# Patient Record
(demographics unavailable — no encounter records)

---

## 2025-06-02 NOTE — CHIEF COMPLAINT
[Family Member] : family member [FreeTextEntry1] : [facility] [place] [facility] [address] [place] [address]

## 2025-06-02 NOTE — END OF VISIT
[FreeTextEntry3] :  I, Dr. Cristal Hamilton, personally performed the evaluation and management of (E/M) services for this new patient. That E/M includes conducting the initial examination, assessing all conditions, and establishing the plan of care. Today, Simran De La Cruz PA-C, was here to observe my evaluation and management services for this patient to be followed going forward.

## 2025-06-02 NOTE — HISTORY OF PRESENT ILLNESS
[FreeTextEntry1] : 65 yo  via 2  referred by [facility] for cervical mass.  Patient PMH significant for HTN, borderline DM, morbid obesity, HFpEF, LAUREN (non-compliant with CPAP), asthma, epilepsy, iron deficiency anemia, gout, RA tardive dyskinesia, anxiety depression and recently admitted to [facility] s/p multiple seizures after running out of her medication, syncope and VB s/p 8u PRBC found to have BL segmental and subsegmental PE's.   25 US Technically limited exam. Heterogeneous, fibroid uterus. IUD with complex fluid in the endometrial cavity, possibly blood products. Endometrium appears thickened, measuring 0.9 cm. Gynecologic  consultation for tissue sampling is recommended. Nonvisualization of the ovaries. Consider nonemergent pelvic MRI for further evaluation.  CT angio chest PE protocol 25: Bilateral pulmonary embolism. No definite right heart strain. Left breast skin thickening with prominent left axillary lymph nodes. Bilateral lung nodules measuring up to 8mm.  MR pelvis 25 large cervical tumor with parametrial invasion. Suspicion for tumor involvement of the lower endometrium and posterior myometrium. Separate nodularity of the lower one-third of the vagina is also consistent with tumor.   Patient denies ever going 1 year without menses, she reports increased constipation and urinary frequency. She reports daily bleeding for years which worsened recently. She had seen a GYN in 2024 and states she was told she has a cervical polyp. She has had IUD in place for 38 years.   Lpap: unsure, never abnormal Lmammo: a while ago Lcolonoscopy: 30+ years ago

## 2025-06-02 NOTE — ASSESSMENT
[FreeTextEntry1] : 65 yo female with cervical mass concerning for advanced cervical malignancy, pathology pending.   Discussed with patient based on her imaging it is likely we area dealing witha cevical cancer. It is unclear at this time if her lung findings are related to her cervical cancer as they are small enough at this time that a benign process could be the cause. I would like to further evaluate with PET CT. Additionally, I would like patient to have mammogram for skin thickening noted on CT angio. We discussed pending PET and final pathology treatment planning will include a combination of chemo radiation or one without the other. Should patient have lung involvement she will likely recieve chemotherapy only and hold RT for syptomatic control of VB. Alternatively, if lung is negative then she will require 5 weeks of chemoRT.

## 2025-06-02 NOTE — CHIEF COMPLAINT
[Family Member] : family member [FreeTextEntry1] : [facility] [place] Office [address] [place] [address]

## 2025-06-02 NOTE — PLAN
[TextEntry] : Await final pathology, will call pt with final result MED ONC and RAD ONC referral for working diagnosis of cervical cancer PET CT to r/o metastatic disease in chest (pulmonary nodules on CT at [facility]) Diagnostic mammogram ordered for breast thickening/tenderness RN navigation team to assist in scheduling appointments PCP to assist in setting up home health care, as [facility] needs to come from PCP d/t specific needs Pt to call with worsened VB or anemia symptoms

## 2025-06-02 NOTE — PHYSICAL EXAM
[Chaperoned Physical Exam] : A chaperone was present in the examining room during all aspects of the physical examination. [PA] : PA [Normal] : Vagina: Normal [Abnormal] : Cervix: Abnormal [FreeTextEntry2] : Simran De La Cruz [de-identified] : IZA TTP [de-identified] : Tenderness of L axilla [de-identified] : Focal tenderness of left breast at 1:00, darkening of skin at this area as well, R axilla WNL [de-identified] : friable mass of cervix, monsels applied.

## 2025-06-02 NOTE — PHYSICAL EXAM
[Chaperoned Physical Exam] : A chaperone was present in the examining room during all aspects of the physical examination. [PA] : PA [Normal] : Vagina: Normal [Abnormal] : Cervix: Abnormal [FreeTextEntry2] : Simran De La Cruz [de-identified] : IZA TTP [de-identified] : Tenderness of L axilla [de-identified] : Focal tenderness of left breast at 1:00, darkening of skin at this area as well, R axilla WNL [de-identified] : friable mass of cervix, monsels applied.

## 2025-06-02 NOTE — PHYSICAL EXAM
[Chaperoned Physical Exam] : A chaperone was present in the examining room during all aspects of the physical examination. [PA] : PA [Normal] : Vagina: Normal [Abnormal] : Cervix: Abnormal [FreeTextEntry2] : Simarn De La Cruz [de-identified] : IZA TTP [de-identified] : Tenderness of L axilla [de-identified] : Focal tenderness of left breast at 1:00, darkening of skin at this area as well, R axilla WNL [de-identified] : friable mass of cervix, monsels applied.

## 2025-06-02 NOTE — ASSESSMENT
[FreeTextEntry1] : 67 yo female with cervical mass concerning for advanced cervical malignancy, pathology pending.   Discussed with patient based on her imaging it is likely we area dealing witha cevical cancer. It is unclear at this time if her lung findings are related to her cervical cancer as they are small enough at this time that a benign process could be the cause. I would like to further evaluate with PET CT. Additionally, I would like patient to have mammogram for skin thickening noted on CT angio. We discussed pending PET and final pathology treatment planning will include a combination of chemo radiation or one without the other. Should patient have lung involvement she will likely recieve chemotherapy only and hold RT for syptomatic control of VB. Alternatively, if lung is negative then she will require 5 weeks of chemoRT.

## 2025-06-02 NOTE — HISTORY OF PRESENT ILLNESS
[FreeTextEntry1] : 67 yo  via 2  referred by [facility] for cervical mass.  Patient PMH significant for HTN, borderline DM, morbid obesity, HFpEF, LAUREN (non-compliant with CPAP), asthma, epilepsy, iron deficiency anemia, gout, RA tardive dyskinesia, anxiety depression and recently admitted to [facility] s/p multiple seizures after running out of her medication, syncope and VB s/p 8u PRBC found to have BL segmental and subsegmental PE's.   25 US Technically limited exam. Heterogeneous, fibroid uterus. IUD with complex fluid in the endometrial cavity, possibly blood products. Endometrium appears thickened, measuring 0.9 cm. Gynecologic  consultation for tissue sampling is recommended. Nonvisualization of the ovaries. Consider nonemergent pelvic MRI for further evaluation.  CT angio chest PE protocol 25: Bilateral pulmonary embolism. No definite right heart strain. Left breast skin thickening with prominent left axillary lymph nodes. Bilateral lung nodules measuring up to 8mm.  MR pelvis 25 large cervical tumor with parametrial invasion. Suspicion for tumor involvement of the lower endometrium and posterior myometrium. Separate nodularity of the lower one-third of the vagina is also consistent with tumor.   Patient denies ever going 1 year without menses, she reports increased constipation and urinary frequency. She reports daily bleeding for years which worsened recently. She had seen a GYN in 2024 and states she was told she has a cervical polyp. She has had IUD in place for 38 years.   Lpap: unsure, never abnormal Lmammo: a while ago Lcolonoscopy: 30+ years ago

## 2025-06-02 NOTE — PLAN
[TextEntry] : Await final pathology, will call pt with final result MED ONC and RAD ONC referral for working diagnosis of cervical cancer PET CT to r/o metastatic disease in chest (pulmonary nodules on CT at [facility]) Diagnostic mammogram ordered for breast thickening/tenderness RN navigation team to assist in scheduling appointments PCP to assist in setting up home health care, [facility] needs to come from PCP d/t specific needs Pt to call with worsened VB or anemia symptoms

## 2025-06-02 NOTE — CHIEF COMPLAINT
[Family Member] : family member [FreeTextEntry1] : [facility] [place] Office [address] [place] [address]

## 2025-06-02 NOTE — PLAN
[TextEntry] : Await final pathology, will call pt with final result MED ONC and RAD ONC referral for working diagnosis of cervical cancer PET CT to r/o metastatic disease in chest (pulmonary nodules on CT at [facility]) Diagnostic mammogram ordered for breast thickening/tenderness RN navigation team to assist in scheduling appointments PCP to assist in setting up home health care, as [facility] needs to come from PCP d/t specific needs Pt to call with worsened VB or anemia symptoms

## 2025-06-02 NOTE — HISTORY OF PRESENT ILLNESS
[FreeTextEntry1] : 65 yo  via 2  referred by [facility] for cervical mass.  Patient PMH significant for HTN, borderline DM, morbid obesity, HFpEF, LAUREN (non-compliant with CPAP), asthma, epilepsy, iron deficiency anemia, gout, RA tardive dyskinesia, anxiety depression and recently admitted to [facility] s/p multiple seizures after running out of her medication, syncope and VB s/p 8u PRBC found to have BL segmental and subsegmental PE's.   25 US Technically limited exam. Heterogeneous, fibroid uterus. IUD with complex fluid in the endometrial cavity, possibly blood products. Endometrium appears thickened, measuring 0.9 cm. Gynecologic  consultation for tissue sampling is recommended. Nonvisualization of the ovaries. Consider nonemergent pelvic MRI for further evaluation.  CT angio chest PE protocol 25: Bilateral pulmonary embolism. No definite right heart strain. Left breast skin thickening with prominent left axillary lymph nodes. Bilateral lung nodules measuring up to 8mm.  MR pelvis 25 large cervical tumor with parametrial invasion. Suspicion for tumor involvement of the lower endometrium and posterior myometrium. Separate nodularity of the lower one-third of the vagina is also consistent with tumor.   Patient denies ever going 1 year without menses, she reports increased constipation and urinary frequency. She reports daily bleeding for years which worsened recently. She had seen a GYN in 2024 and states she was told she has a cervical polyp. She has had IUD in place for 38 years.   Lpap: unsure, never abnormal Lmammo: a while ago Lcolonoscopy: 30+ years ago

## 2025-06-03 NOTE — REASON FOR VISIT
[CV Risk Factors and Non-Cardiac Disease] : CV risk factors and non-cardiac disease [FreeTextEntry3] : Vinnie Nichols MD-[address] [place] [id number] [FreeTextEntry1] : History was provided by patient and chart review.

## 2025-06-03 NOTE — HISTORY OF PRESENT ILLNESS
[FreeTextEntry1] : Ms. Zelda Estrada was seen on 6/3/2025. The patient is a 66-year-old woman with HTN, pre-DM, mild AS, mild MR, morbid obesity, HFpEF, LAUREN (non-compliant with CPAP), asthma, epilepsy, iron deficiency anemia, gout, RA tardive dyskinesia, anxiety depression and recently admitted to [facility] s/p multiple seizures after running out of her medication, syncope and vaginal bleed s/p 8u PRBC found to have BL segmental and subsegmental PE's now on apixaban 5 mg BID. She was referred specifically for ***.     Patient denies ***. Family history: patient reports no knowledge of premature CAD, SCD, device therapies or congenital heart disease in the family. Social history: patient denies smoking, drinking alcohol or using illicit drugs. CV meds: carvedilol 12.5 mg BID, clopidogrel 75 mg daily, simvastatin 40 mg daily Exercise: Diet:

## 2025-06-03 NOTE — CARDIOLOGY SUMMARY
[de-identified] : MPI stress 5/21/2025 1. Normal myocardial perfusion scan, with no evidence of infarction or inducible ischemia.  2. The patient underwent stress testing using pharmacological IV regadenoson.     _ The total procedure time was 4 minutes .     _ The peak heart rate was 11 bpm: 7 % of the patient's predicted maximal heart rate. The heart rate response was normal pharmocologic heart rate response.     _ There was a normal pharmocologic blood pressure response with a maximum blood pressure of 244/72 mmHg.  3. Baseline electrocardiogram: normal sinus rhythm at a rate of 76 bpm with no arrhythmias and T wave inversion.  4. Normal left ventricular regional wall motion.  5. The left ventricle is normal in function and normal in size. The post stress left ventricular EF is 76 %. The stress end diastolic volume is 83 ml and systolic volume is 20 ml.  6. Chest pain prior to test: no chest pain. [de-identified] : TTE 5/7/2025  1. Left ventricular cavity is normal in size. Left ventricular wall thickness is normal. Left ventricular systolic function is normal with an ejection fraction of 65 % by Barney's method of disks. There are no regional wall motion abnormalities seen.  2. Normal left ventricular diastolic function, with normal left ventricular filling pressure.  3. Mildly enlarged right ventricular cavity size and borderline reduced right ventricular systolic function.  4. The right atrium is mildly dilated.  5. Trileaflet aortic valve. Mild aortic stenosis.  6. The peak transaortic velocity is 2.15 m/s, peak transaortic gradient is 18.5 mmHg and mean transaortic gradient is 10.0 mmHg with an LVOT/aortic valve VTI ratio of 0.51. The effective orifice area is estimated at 1.54 cm  7. There is mild calcification of the mitral valve annulus.  8. Mild mitral regurgitation.  9. Mild mitral valve leaflet calcification. 10. Estimated pulmonary artery systolic pressure is 56 mmHg, moderate pulmonary hypertension. 11. No pericardial effusion seen. 12. No prior echocardiogram is available for comparison. [de-identified] : CT angio chest PE protocol 5/7/25: Bilateral pulmonary embolism. No definite right heart strain. Left breast skin thickening with prominent left axillary lymph nodes. Bilateral lung nodules measuring up to 8mm.

## 2025-06-03 NOTE — HISTORY OF PRESENT ILLNESS
[FreeTextEntry1] : Ms. Zelda Estrada was seen on 6/3/2025. The patient is a 66-year-old woman with HTN, pre-DM, mild AS, mild MR, morbid obesity, HFpEF, LAUREN (non-compliant with CPAP), asthma, epilepsy, iron deficiency anemia, gout, RA tardive dyskinesia, anxiety depression and recently admitted to [facility] s/p multiple seizures after running out of her medication, syncope and vaginal bleed s/p 8u PRBC found to have BL segmental and subsegmental PE's now on apixaban 5 mg BID. She was referred specifically for ***.     Patient denies ***. Family history: patient reports no knowledge of premature CAD, SCD, device therapies or congenital heart disease in the family. Social history: patient denies smoking, drinking alcohol or using illicit drugs. CV meds: carvedilol 12.5 mg BID, clopidogrel 75 mg daily, simvastatin 40 mg daily Exercise: Diet:

## 2025-06-03 NOTE — REASON FOR VISIT
[CV Risk Factors and Non-Cardiac Disease] : CV risk factors and non-cardiac disease [FreeTextEntry3] : Vinnie Nichols MD-[address] [place] [id number] [FreeTextEntry1] : History was provided by patient and chart review.

## 2025-06-03 NOTE — CARDIOLOGY SUMMARY
[de-identified] : MPI stress 5/21/2025 1. Normal myocardial perfusion scan, with no evidence of infarction or inducible ischemia.  2. The patient underwent stress testing using pharmacological IV regadenoson.     _ The total procedure time was 4 minutes .     _ The peak heart rate was 11 bpm: 7 % of the patient's predicted maximal heart rate. The heart rate response was normal pharmocologic heart rate response.     _ There was a normal pharmocologic blood pressure response with a maximum blood pressure of 244/72 mmHg.  3. Baseline electrocardiogram: normal sinus rhythm at a rate of 76 bpm with no arrhythmias and T wave inversion.  4. Normal left ventricular regional wall motion.  5. The left ventricle is normal in function and normal in size. The post stress left ventricular EF is 76 %. The stress end diastolic volume is 83 ml and systolic volume is 20 ml.  6. Chest pain prior to test: no chest pain. [de-identified] : TTE 5/7/2025  1. Left ventricular cavity is normal in size. Left ventricular wall thickness is normal. Left ventricular systolic function is normal with an ejection fraction of 65 % by Barney's method of disks. There are no regional wall motion abnormalities seen.  2. Normal left ventricular diastolic function, with normal left ventricular filling pressure.  3. Mildly enlarged right ventricular cavity size and borderline reduced right ventricular systolic function.  4. The right atrium is mildly dilated.  5. Trileaflet aortic valve. Mild aortic stenosis.  6. The peak transaortic velocity is 2.15 m/s, peak transaortic gradient is 18.5 mmHg and mean transaortic gradient is 10.0 mmHg with an LVOT/aortic valve VTI ratio of 0.51. The effective orifice area is estimated at 1.54 cm  7. There is mild calcification of the mitral valve annulus.  8. Mild mitral regurgitation.  9. Mild mitral valve leaflet calcification. 10. Estimated pulmonary artery systolic pressure is 56 mmHg, moderate pulmonary hypertension. 11. No pericardial effusion seen. 12. No prior echocardiogram is available for comparison. [de-identified] : CT angio chest PE protocol 5/7/25: Bilateral pulmonary embolism. No definite right heart strain. Left breast skin thickening with prominent left axillary lymph nodes. Bilateral lung nodules measuring up to 8mm.

## 2025-06-09 NOTE — DISCUSSION/SUMMARY
[EKG obtained to assist in diagnosis and management of assessed problem(s)] : EKG obtained to assist in diagnosis and management of assessed problem(s) [FreeTextEntry1] : 66y F w/ HTN, borderline DM, Obese, CHF (HFpEF), LAUREN (noncompliant with CPAP), asthma, epilepsy, iron def anemia, gout, RA, tardive dyskinesia, anxiety, depression, who presented to [facility] with multiple seizures (after running out of seizure medications), syncope and recent vaginal bleeding, found to have Hg 3.8 and bilateral segmental and subsegmental PEs with concern for underlying malignancy. Also found to have nocturnal pauses up to 13s duration resulting in ILR insertion for arrhythmia surveillance.   Patient presented to the office today s/p ILR implant on 5/14/25 with Dr. Carlos. Since hospital discharge, she reports to be feeling well. She denies further syncopal episodes/seizures. She denies palpitations, chest pain/tightness, dizziness. ILR insertion site is now healed well without s/s of infection or complication. Interrogation of device reveals no arrhythmia or pause episodes. We reviewed use of symptom recorder. She is connected to remote monitoring.    Recommendation - Encouraged outpatient f/u with Sleep specialist and compliance with CPAP, asymptomatic nocturnal pauses in the setting of untreated LAUREN. -Continue to hold Coreg and other AVN blocking agents.  -If symptomatic bradyarrhythmia, or evidence of concomitant prolonged bradycardia during seizure events, may consider PPM (likely MICRA Leadless PPM).  -Continue remote monitoring of ILR, reviewed symptom recorder and indication for use.  -Weight loss and lifestyle modifications with diet and exercise.  -F/u with Heme/Onc- MRI -EP f/u in 6 months or sooner PRN

## 2025-06-09 NOTE — PHYSICAL EXAM
[No Acute Distress] : no acute distress [Normal S1, S2] : normal S1, S2 [Clear Lung Fields] : clear lung fields [No Edema] : no edema [No Rash] : no rash [Moves all extremities] : moves all extremities [Alert and Oriented] : alert and oriented [de-identified] : ILR insertion site is healed well.

## 2025-06-09 NOTE — CARDIOLOGY SUMMARY
[de-identified] : 6/9/25: NSR HR 76 bpm.  [de-identified] : TTE 5/7/25: CONCLUSIONS: 1. Left ventricular cavity is normal in size. Left ventricular wall thickness is normal. Left ventricular systolic function is normal with an ejection fraction of 65 % by Barney's method of disks. There are no regional wall motion abnormalities seen. 2. Normal left ventricular diastolic function, with normal left ventricular filling pressure. 3. Mildly enlarged right ventricular cavity size and borderline reduced right ventricular systolic function. 4. The right atrium is mildly dilated. 5. Trileaflet aortic valve. Mild aortic stenosis. 6. The peak transaortic velocity is 2.15 m/s, peak transaortic gradient is 18.5 mmHg and mean transaortic gradient is 10.0 mmHg with an LVOT/aortic valve VTI ratio of 0.51. The effective orifice area is estimated at 1.54 cm 7. There is mild calcification of the mitral valve annulus. 8. Mild mitral regurgitation. 9. Mild mitral valve leaflet calcification. 10. Estimated pulmonary artery systolic pressure is 56 mmHg, moderate pulmonary hypertension. 11. No pericardial effusion seen. 12. No prior echocardiogram is available for comparison.

## 2025-06-09 NOTE — HISTORY OF PRESENT ILLNESS
No
[FreeTextEntry1] : 66y F w/ HTN, borderline DM, Obese, CHF (HFpEF), LAUREN (noncompliant with CPAP), asthma, epilepsy, iron def anemia, gout, RA, tardive dyskinesia, anxiety, depression, who presented to [facility] with multiple seizures (after running out of seizure medications), syncope and recent vaginal bleeding, found to have Hg 3.8 and bilateral segmental and subsegmental PEs with concern for underlying malignancy. Also found to have nocturnal pauses up to 13s duration resulting in EP consult. S/p ILR implant 5/14/25.   Of note, during hospitalization she was transfused 8 units PRBC. Reports history of recurrent syncope in the past, often in the setting of seizures. TTE showed LVEF 65%, mild MR, mild RV enlargement, w/ reduced RV function, rsvp 56mmhg. Telemetry showed SR with daytime HR 60-100bpm. Occasional nocturnal pauses, lasting 3-13s duration (off CPAP). Coreg was discontinued after 13sec pause (5/11 @ 230am). NO recurrent pauses after discontinuation of BB. Found to have cervical mass and being followed by Gyn Onc biopsy collected and pathology pending with ongoing vaginal bleeding.   Prior to discharge from [facility] she received ILR for arrhythmia surveillance d/t pause episodes and recent syncope.    She underwent ILR implant on 5/14/25 with Dr. Carlos. Presents today for arrhythmia follow-up and ILR site assessment. Since hospital discharge, she reports to be feeling well. She denies further syncopal episodes/seizures. She denies palpitations, chest pain/tightness, dizziness.   Left parasternal ILR incision well approximated without erythema, edema, drainage, exudate or signs of infection.  ILR in office interrogation reveals: No AF burden, no arrhythmias. HR trends are WNLs.   EKG today: NSR HR 76 bpm 
[FreeTextEntry1] : 66y F w/ HTN, borderline DM, Obese, CHF (HFpEF), LAUREN (noncompliant with CPAP), asthma, epilepsy, iron def anemia, gout, RA, tardive dyskinesia, anxiety, depression, who presented to [facility] with multiple seizures (after running out of seizure medications), syncope and recent vaginal bleeding, found to have Hg 3.8 and bilateral segmental and subsegmental PEs with concern for underlying malignancy. Also found to have nocturnal pauses up to 13s duration resulting in EP consult. S/p ILR implant 5/14/25.   Of note, during hospitalization she was transfused 8 units PRBC. Reports history of recurrent syncope in the past, often in the setting of seizures. TTE showed LVEF 65%, mild MR, mild RV enlargement, w/ reduced RV function, rsvp 56mmhg. Telemetry showed SR with daytime HR 60-100bpm. Occasional nocturnal pauses, lasting 3-13s duration (off CPAP). Coreg was discontinued after 13sec pause (5/11 @ 230am). NO recurrent pauses after discontinuation of BB. Found to have cervical mass and being followed by Gyn Onc biopsy collected and pathology pending with ongoing vaginal bleeding.   Prior to discharge from [facility] she received ILR for arrhythmia surveillance d/t pause episodes and recent syncope.    She underwent ILR implant on 5/14/25 with Dr. Carlos. Presents today for arrhythmia follow-up and ILR site assessment. Since hospital discharge, she reports to be feeling well. She denies further syncopal episodes/seizures. She denies palpitations, chest pain/tightness, dizziness.   Left parasternal ILR incision well approximated without erythema, edema, drainage, exudate or signs of infection.  ILR in office interrogation reveals: No AF burden, no arrhythmias. HR trends are WNLs.   EKG today: NSR HR 76 bpm

## 2025-06-09 NOTE — DISCUSSION/SUMMARY
[FreeTextEntry1] : 66y F w/ HTN, borderline DM, Obese, CHF (HFpEF), LAUREN (noncompliant with CPAP), asthma, epilepsy, iron def anemia, gout, RA, tardive dyskinesia, anxiety, depression, who presented to [facility] with multiple seizures (after running out of seizure medications), syncope and recent vaginal bleeding, found to have Hg 3.8 and bilateral segmental and subsegmental PEs with concern for underlying malignancy. Also found to have nocturnal pauses up to 13s duration resulting in ILR insertion for arrhythmia surveillance.   Patient presented to the office today s/p ILR implant on 5/14/25 with Dr. Carlos. Since hospital discharge, she reports to be feeling well. She denies further syncopal episodes/seizures. She denies palpitations, chest pain/tightness, dizziness. ILR insertion site is now healed well without s/s of infection or complication. Interrogation of device reveals no arrhythmia or pause episodes. We reviewed use of symptom recorder. She is connected to remote monitoring.    Recommendation - Encouraged outpatient f/u with Sleep specialist and compliance with CPAP, asymptomatic nocturnal pauses in the setting of untreated LAUREN. -Continue to hold Coreg and other AVN blocking agents.  -If symptomatic bradyarrhythmia, or evidence of concomitant prolonged bradycardia during seizure events, may consider PPM (likely MICRA Leadless PPM).  -Continue remote monitoring of ILR, reviewed symptom recorder and indication for use.  -Weight loss and lifestyle modifications with diet and exercise.  -F/u with Heme/Onc- MRI -EP f/u in 6 months or sooner PRN    [EKG obtained to assist in diagnosis and management of assessed problem(s)] : EKG obtained to assist in diagnosis and management of assessed problem(s)

## 2025-06-09 NOTE — PHYSICAL EXAM
[No Acute Distress] : no acute distress [Normal S1, S2] : normal S1, S2 [Clear Lung Fields] : clear lung fields [No Edema] : no edema [No Rash] : no rash [Moves all extremities] : moves all extremities [Alert and Oriented] : alert and oriented [de-identified] : ILR insertion site is healed well.

## 2025-06-09 NOTE — CARDIOLOGY SUMMARY
[de-identified] : 6/9/25: NSR HR 76 bpm.  [de-identified] : TTE 5/7/25: CONCLUSIONS: 1. Left ventricular cavity is normal in size. Left ventricular wall thickness is normal. Left ventricular systolic function is normal with an ejection fraction of 65 % by Barney's method of disks. There are no regional wall motion abnormalities seen. 2. Normal left ventricular diastolic function, with normal left ventricular filling pressure. 3. Mildly enlarged right ventricular cavity size and borderline reduced right ventricular systolic function. 4. The right atrium is mildly dilated. 5. Trileaflet aortic valve. Mild aortic stenosis. 6. The peak transaortic velocity is 2.15 m/s, peak transaortic gradient is 18.5 mmHg and mean transaortic gradient is 10.0 mmHg with an LVOT/aortic valve VTI ratio of 0.51. The effective orifice area is estimated at 1.54 cm 7. There is mild calcification of the mitral valve annulus. 8. Mild mitral regurgitation. 9. Mild mitral valve leaflet calcification. 10. Estimated pulmonary artery systolic pressure is 56 mmHg, moderate pulmonary hypertension. 11. No pericardial effusion seen. 12. No prior echocardiogram is available for comparison.

## 2025-06-09 NOTE — REVIEW OF SYSTEMS
[Feeling Fatigued] : not feeling fatigued [SOB] : no shortness of breath [Dyspnea on exertion] : not dyspnea during exertion [Lower Ext Edema] : no extremity edema [Chest Discomfort] : no chest discomfort [Cough] : no cough [Syncope] : no syncope [Rash] : no rash [Dizziness] : no dizziness [Weakness] : no weakness [Easy Bruising] : no tendency for easy bruising [Easy Bleeding] : no tendency for easy bleeding [FreeTextEntry5] : see HPI

## 2025-06-09 NOTE — REVIEW OF SYSTEMS
[Feeling Fatigued] : not feeling fatigued [SOB] : no shortness of breath [Dyspnea on exertion] : not dyspnea during exertion [Lower Ext Edema] : no extremity edema [Chest Discomfort] : no chest discomfort [Cough] : no cough [Syncope] : no syncope [Dizziness] : no dizziness [Rash] : no rash [Easy Bleeding] : no tendency for easy bleeding [Easy Bruising] : no tendency for easy bruising [Weakness] : no weakness [FreeTextEntry5] : see HPI

## 2025-06-12 NOTE — HISTORY OF PRESENT ILLNESS
[FreeTextEntry1] : 66 year old woman presented today for consultation regarding radiation therapy for cervical carcinoma.  She has a PMH of arthritis, Gout HTN, loop recorder, seizures, PE, CHF, renal calculi anemia,   5/6/25 She  presented to the ED at [facility] with dyspnea, seizures (had run out of her antiseizure medication) and vaginal bleeding.  US pelvis/transvaginal: IMPRESSION: Technically limited exam. Heterogeneous, fibroid uterus. IUD with complex fluid in the endometrial cavity, possibly blood products. Endometrium appears thickened, measuring 0.9 cm. Gynecologic consultation for tissue sampling is recommended. Nonvisualization of the ovaries. Consider nonemergent pelvic MRI for further evaluation.  5/6/25 endometrial biopsy: Final Diagnosis 1.  Endometrium, biopsy: - Fragments of poorly preserved atypical cells, suspicious for carcinoma, see comment. - Rare strips of benign squamous epithelium.  5/7/25 Ct angio chest: IMPRESSION: Bilateral pulmonary embolism. No definite right heart strain. Left breast skin thickening with prominent left axillary lymph nodes. Bilateral lung nodules measuring up to 8mm  5/8/25 US duplex lower extremities: IMPRESSION:  No acute DVT of the right or left lower extremity, of the visualized  venous segments as above. Edema of the superficial soft tissues of the lower extremities. Correlate clinically.  discharged.  5/12/25 MRI pelvis: IMPRESSION: *  Large cervical tumor with parametrial invasion. Suspicion for tumor involvement of the lower endometrium and posterior myometrium. *  Separate nodularity of the lower one-third of the vagina is also consistent with tumor. *  Pelvic lymphadenopathy. 5/17/25 CT angio chest: IMPRESSION: Left upper lobe pulmonary embolism extending from the lobar branches, partially improved from the prior study dated 5/7/2025. Bilateral lung nodules measuring up to 8mm. While this may represent impacted small airways or intraprenchymal lymph nodes, without a known history of primary malignancy or significant risk factors, a one-year follow-up chest CT scan can be performed to ensure nodule stability. If this patient has a known history of primary malignancy or significant risk factors, two-year surveillance should continue with a six month chest CT scan.   Treated with heparin, transitioned to Eliquis on d/c  ILR for arrhythmia surveillance  discharged   5/20/25  readmitted for anemia transfused discharged 5/21/25 5/29/25 Met with Janet Hamilton  6/10/25 Pet CT: IMPRESSION: 1.  Enlarged cervix shows intense uptake consistent with malignant involvement. Adnexa and vagina may be scintigraphically as well. 2.  Multiple metastatic FDG-avid pulmonary nodules seen bilaterally. 3.  Hypermetabolic lymph nodes are seen above and below diaphragm consistent with malignant involvement. 4.  Nonspecific heterogeneous marrow uptake, nonspecific. Consider MRI if there is suspicion for marrow involvement, but this is not strongly favored.  6/10/25 Re-admitted from radiology with increased bleeding and presyncope  hemoglobin 6.9, given 2 units of PRBC's  6/13/25 scheduled for EUA/cervical biopsy/cystoscopy/proctoscopy/ possible hysteroscopy and D&C.   She is scheduled for mammogram US on 6/16/25   vaginal pruritus, vaginal discharge, edema, change in bowel, change in urination, pelvic pain, cramping fatigue or weight loss  Care team: PCP Vinnie Nichols GYn ONC Janet Reeder

## 2025-06-12 NOTE — HISTORY OF PRESENT ILLNESS
[FreeTextEntry1] : 66 year old woman presented today for consultation regarding radiation therapy for cervical carcinoma.  She has a PMH of arthritis, Gout HTN, loop recorder, seizures, PE, CHF, renal calculi anemia,   5/6/25 She  presented to the ED at [facility] with dyspnea, seizures (had run out of her antiseizure medication) and vaginal bleeding.  US pelvis/transvaginal: IMPRESSION: Technically limited exam. Heterogeneous, fibroid uterus. IUD with complex fluid in the endometrial cavity, possibly blood products. Endometrium appears thickened, measuring 0.9 cm. Gynecologic consultation for tissue sampling is recommended. Nonvisualization of the ovaries. Consider nonemergent pelvic MRI for further evaluation.  5/6/25 endometrial biopsy: Final Diagnosis 1.  Endometrium, biopsy: - Fragments of poorly preserved atypical cells, suspicious for carcinoma, see comment. - Rare strips of benign squamous epithelium.  5/7/25 Ct angio chest: IMPRESSION: Bilateral pulmonary embolism. No definite right heart strain. Left breast skin thickening with prominent left axillary lymph nodes. Bilateral lung nodules measuring up to 8mm  5/8/25 US duplex lower extremities: IMPRESSION:  No acute DVT of the right or left lower extremity, of the visualized  venous segments as above. Edema of the superficial soft tissues of the lower extremities. Correlate clinically.  discharged.  5/12/25 MRI pelvis: IMPRESSION: *  Large cervical tumor with parametrial invasion. Suspicion for tumor involvement of the lower endometrium and posterior myometrium. *  Separate nodularity of the lower one-third of the vagina is also consistent with tumor. *  Pelvic lymphadenopathy. 5/17/25 CT angio chest: IMPRESSION: Left upper lobe pulmonary embolism extending from the lobar branches, partially improved from the prior study dated 5/7/2025. Bilateral lung nodules measuring up to 8mm. While this may represent impacted small airways or intraprenchymal lymph nodes, without a known history of primary malignancy or significant risk factors, a one-year follow-up chest CT scan can be performed to ensure nodule stability. If this patient has a known history of primary malignancy or significant risk factors, two-year surveillance should continue with a six month chest CT scan.   Treated with heparin, transitioned to Eliquis on d/c  ILR for arrhythmia surveillance  discharged   5/20/25  readmitted for anemia transfused discharged 5/21/25 5/29/25 Met with Janet Hamilton  6/10/25 Pet CT: IMPRESSION: 1.  Enlarged cervix shows intense uptake consistent with malignant involvement. Adnexa and vagina may be scintigraphically as well. 2.  Multiple metastatic FDG-avid pulmonary nodules seen bilaterally. 3.  Hypermetabolic lymph nodes are seen above and below diaphragm consistent with malignant involvement. 4.  Nonspecific heterogeneous marrow uptake, nonspecific. Consider MRI if there is suspicion for marrow involvement, but this is not strongly favored.  6/10/25 Re-admitted from radiology with increased bleeding and presyncope  hemoglobin 6.9, given 2 units of PRBC's  6/13/25 scheduled for EUA/cervical biopsy/cystoscopy/proctoscopy/ possible hysteroscopy and D&C.   She is scheduled for mammogram US on 6/16/25   vaginal pruritus, vaginal discharge, edema, change in bowel, change in urination, pelvic pain, cramping fatigue or weight loss  Care team: PCP Vinnie Nichols GYn ONC Janet Reeder

## 2025-06-19 NOTE — HISTORY OF PRESENT ILLNESS
[FreeTextEntry1] : 66 year old woman presented today for consultation regarding radiation therapy for cervical carcinoma.  She has a PMH of arthritis, Gout HTN, loop recorder, seizures, PE, CHF, renal calculi anemia,   5/6/25 She  presented to the ED at [facility] with dyspnea, seizures (had run out of her antiseizure medication) and vaginal bleeding.  US pelvis/transvaginal: IMPRESSION: Technically limited exam. Heterogeneous, fibroid uterus. IUD with complex fluid in the endometrial cavity, possibly blood products. Endometrium appears thickened, measuring 0.9 cm. Gynecologic consultation for tissue sampling is recommended. Nonvisualization of the ovaries. Consider nonemergent pelvic MRI for further evaluation.  5/6/25 endometrial biopsy: Final Diagnosis 1.  Endometrium, biopsy: - Fragments of poorly preserved atypical cells, suspicious for carcinoma, see comment. - Rare strips of benign squamous epithelium.  5/7/25 Ct angio chest: IMPRESSION: Bilateral pulmonary embolism. No definite right heart strain. Left breast skin thickening with prominent left axillary lymph nodes. Bilateral lung nodules measuring up to 8mm  5/8/25 US duplex lower extremities: IMPRESSION:  No acute DVT of the right or left lower extremity, of the visualized  venous segments as above. Edema of the superficial soft tissues of the lower extremities. Correlate clinically.  discharged.  5/12/25 MRI pelvis: IMPRESSION: *  Large cervical tumor with parametrial invasion. Suspicion for tumor involvement of the lower endometrium and posterior myometrium. *  Separate nodularity of the lower one-third of the vagina is also consistent with tumor. *  Pelvic lymphadenopathy. 5/17/25 CT angio chest: IMPRESSION: Left upper lobe pulmonary embolism extending from the lobar branches, partially improved from the prior study dated 5/7/2025. Bilateral lung nodules measuring up to 8mm. While this may represent impacted small airways or intraprenchymal lymph nodes, without a known history of primary malignancy or significant risk factors, a one-year follow-up chest CT scan can be performed to ensure nodule stability. If this patient has a known history of primary malignancy or significant risk factors, two-year surveillance should continue with a six month chest CT scan.   Treated with heparin, transitioned to Eliquis on d/c  ILR for arrhythmia surveillance  discharged   5/20/25  readmitted for anemia transfused discharged 5/21/25 5/29/25 Met with Janet Hamilton  6/10/25 Pet CT: IMPRESSION: 1.  Enlarged cervix shows intense uptake consistent with malignant involvement. Adnexa and vagina may be scintigraphically as well. 2.  Multiple metastatic FDG-avid pulmonary nodules seen bilaterally. 3.  Hypermetabolic lymph nodes are seen above and below diaphragm consistent with malignant involvement. 4.  Nonspecific heterogeneous marrow uptake, nonspecific. Consider MRI if there is suspicion for marrow involvement, but this is not strongly favored.  6/10/25 Re-admitted from radiology with increased bleeding and presyncope  hemoglobin 6.9, given 2 units of PRBC's  6/13/25 scheduled for EUA/cervical biopsy/cystoscopy/proctoscopy/ possible hysteroscopy and D&C.   She is scheduled for mammogram US on 6/16/25   vaginal pruritus, vaginal discharge, edema, change in bowel, change in urination, pelvic pain, cramping fatigue or weight loss  6/19/25: Patient is seen today for OTV s/p 2/3 fractions to her cervix. Patient reports feeling generally well. She endorses constipation with relief from the use of Colace. She reports vaginal bleeding has improved with slight bleeding noted post voiding. Denies urinary symptoms.  Care team: PCP Vinnie Nichols GYn ONC Janet Alfonso Reeder

## 2025-06-19 NOTE — DISEASE MANAGEMENT
[Clinical] : TNM Stage: c [FreeTextEntry4] : cervical carcinoma [de-identified] : 1000 cGy [de-identified] : 1500 cGy [de-identified] : cervix

## 2025-06-19 NOTE — REVIEW OF SYSTEMS
[Constipation: Grade 1 - Occasional or intermittent symptoms; occasional use of stool softeners, laxatives, dietary modification, or enema] : Constipation: Grade 1 - Occasional or intermittent symptoms; occasional use of stool softeners, laxatives, dietary modification, or enema [Diarrhea: Grade 0] : Diarrhea: Grade 0 [Urinary Retention: Grade 0] : Urinary Retention: Grade 0 [Urinary Urgency: Grade 0] : Urinary Urgency: Grade 0 [Urinary Frequency: Grade 0] : Urinary Frequency: Grade 0

## 2025-07-06 NOTE — ASSESSMENT
[FreeTextEntry1] : Ms. Estrada is a 65 yo F w/ extensive PMHx including HFrEF, HTN, Borderline T2DM, Morbid Obesity (BMI 45), Epilepsy, recent PE on Eliquis, LAUREN, tardive dyskinesia, who presents today as a hospital follow-up with newly diagnosed Stage IVb carcinoma, origin unknown.  Extensive discussion with pt and daughter regarding widely metastatic disease, as well as underlying comorbidities creating complex care. Final pathology does not demonstrate source of origin, cervical vs. uterine. Pathology from EUA/Cervical biopsies on 6/13/25 still pending. Reviewed need for systemic chemotherapy, with Carboplatin AUC 5 / Paclitaxel 175mg/m2 / Pembrolizumab 200mg D1q21d +/- Bevacizumab 15mg/kg if cervical in origin. However, given PEs, hesitation to begin pt with Avastin without confirmed cervical origin (KEYNOTE A18 vs. KEYNOTE 826). Also reviewed given pt's extensive medical comorbidities, as well as Stage IVb disease, pt is not a surgical candidate.  Chemotherapy regimen prescribed: Carboplatin AUC 5 / Paclitaxel 175mg/m2 / Pembrolizumab 200mg D1q21d x 6C followed by Pembrolizumab maintenance 400mg D1 q6wks; Consider repeat imaging after 3 or 6C  Risks discussed: Carboplatin can be associated with low blood counts, including platelets with associated bleeding, white blood cells with risk of infection or sepsis, and anemia. The drug will cause nausea and vomiting, but medication will be given to decrease these effects. There is a risk of renal, hepatic and electrolyte abnormalities. There can be infusion reactions, which are more likely with more cycles administered. This drug is an irritant and may cause local reactions if extravasation occurs. Taxol will cause alopecia and can be associated with peripheral neuropathy which can be permanent or progressive. It can also cause decreased blood counts including white blood cells, with risk of infection or sepsis, anemia and thrombocytopenia. There is a risk of nausea and vomiting, mouth sores, and bone or muscle pain. Infusion can cause hypersensitivity reactions. This drug is an irritant and may cause local reactions if extravasation occurs. Pembrolizumab can cause any autoimmune reaction, most commonly thyroiditis, but can also cause pneumonitis, carditis, colitis, hepatitis, encephalitis, or uveitis among others

## 2025-07-06 NOTE — HISTORY OF PRESENT ILLNESS
[Disease: _____________________] : Disease: [unfilled] [de-identified] : Ms. Estrada is a 65 yo F w/ extensive PMHx including HFrEF, HTN, Borderline T2DM, Morbid Obesity (BMI 45), Epilepsy, recent PE on Eliquis, LAUREN, tardive dyskinsia, Awho presents today as a hospital follow-up with newly diagnosed Stage IVb carcinoma.  MRI Pelvis (5/12/25) FINDINGS: UTERUS: Large cervical mass measuring 5.1 x 4.9 cm. Parametrial invasion of tumor on the left and posteriorly. Additional enhancing, diffusion restricting nodularity separate from the dominant mass involving the lower one-third of the vagina, also consistent with tumor. No ureteral encasement or pelvic sidewall invasion. A few uterine fibroids, largest measuring up to 3 cm on the left. ENDOMETRIUM: 7 mm in thickness with suspicion for tumor involvement at the lower uterine segment endometrium, and possibly the posterior myometrium.  RIGHT OVARY: Normal. LEFT OVARY: Normal. ADNEXA: Within normal limits.  BLADDER: Within normal limits.  LYMPH NODES: Bilateral pelvic lymphadenopathy. Reference enlarged right external iliac chain lymph node measures 2.9 x 1.5 cm (901:20). Enlarged left internal iliac chain lymph node measures 2.0 x 1.3 cm (901:24).  VISUALIZED PORTIONS:  ABDOMINAL ORGANS: Within normal limits. BOWEL: Within normal limits. PERITONEUM: No ascites. VESSELS: Within normal limits. ABDOMINAL WALL: Within normal limits. BONES: Within normal limits.  IMPRESSION: *  Large cervical tumor with parametrial invasion. Suspicion for tumor involvement of the lower endometrium and posterior myometrium. *  Separate nodularity of the lower one-third of the vagina is also consistent with tumor. *  Pelvic lymphadenopathy.  PET CT (6/11/25) FINDINGS:  HEAD/NECK: Increased uptake at the tip of the tongue (SUV 10.0, image 29). Uptake at the vocal cords appear symmetric and is likely physiologic. Otherwise physiologic FDG activity in remaining visualized brain, head, and neck.  CHEST: Hypermetabolic nodes are seen at the subcarinal (1.4 x 1.3 cm, SUV 13.5, image 80), and RIGHT perihilar (SUV 2.3 x 1.6 cm, SUV 7.1, image 84). Mildly avid BILATERAL axillary nodes (i.e. LEFT axilla, 2.0 x 1.6 cm, SUV 3.0, image 86). LUNGS: Multiple FDG-avid pulmonary nodules seen BILATERALLY. For example, a 1.5 x 1.0 cm anterior RIGHT MIDDLE lobe pleural-based nodule with SUV 5.9 (image 86). PLEURA/PERICARDIUM: No abnormal FDG activity. No effusions. ESOPHAGUS/STOMACH: No abnormal FDG activity. HEPATOBILIARY/PANCREAS: Physiologic hepatobiliary FDG activity. For reference, liver SUV mean is 3.7. SPLEEN: Physiologic FDG activity. Normal size. ADRENAL GLANDS: No abnormal FDG activity. No nodule(s). KIDNEYS/URINARY BLADDER: Physiologic excreted FDG activity. REPRODUCTIVE ORGANS: Enlarged cervix with intense uptake (6.3 x 5.6 cm, SUV 20.2, image 192). No invasion of the cervix or extension beyond the corpus uteri. Adnexa may be scintigraphically involved (i.e. LEFT adnexa SUV 4.6, image 188). The vagina may be involved (SUV 11.2, image 211). IUD in place. ABDOMINOPELVIC NODES: Increased activity is seen at multiple iliac chain nodes. For example: Ill-defined lymphadenopathy at the LEFT common iliac region (2.2 x 1.9 cm, SUV 7.2, image 166) Right external iliac (2.5 x 2.2 cm, SUV 12.7, image 182) BOWEL/PERITONEUM/MESENTERY: No abnormal FDG activity. ABDOMINAL WALL: No abnormal FDG activity. BONES/SOFT TISSUES: Heterogeneous marrow uptake is seen which is likely physiologic and appears nonspecific. Muscular uptake adjacent to the shoulders is likely enthesopathic. VESSELS: Atherosclerotic calcification of nonaneurysmal abdominal aorta including visceral and/or runoff branches.  IMPRESSION: 1.  Enlarged cervix shows intense uptake consistent with malignant involvement. Adnexa and vagina may be scintigraphically as well. 2.  Multiple metastatic FDG-avid pulmonary nodules seen bilaterally. 3.  Hypermetabolic lymph nodes are seen above and below diaphragm consistent with malignant involvement. 4.  Nonspecific heterogeneous marrow uptake, nonspecific. Consider MRI if there is suspicion for marrow involvement, but this is not strongly favored.  Path (6/4/25) Final Diagnosis 1.  Endometrium, biopsy: - Fragments of poorly preserved atypical cells, suspicious for carcinoma, see comment. - Rare strips of benign squamous epithelium.  Verified by: Alla Maria MD (Electronic Signature) Reported on: 06/04/25 13:27 EDT, [facility], [address], [place] [address] _________________________________________________________________  Comment  Tissue shows severely fragmented and poorly preserved atypical cells with focal features of papillary architecture. Cytologic features include increased N/C ratio, scant cytoplasm, hyperchromatic nuclei and focal associated apoptosis.  Immunohistochemical stains are as follows: ER-positive IA- focal rare positive p53- wild-type WT1- negative Napsin- negative MMR- intact  The overall findings are highly concerning for carcinoma.  Clinical correlation and close follow-up is essential [de-identified] : Carcinoma [de-identified] : Ms. Estrada is a 65 yo F w/ extensive PMHx including HFrEF, HTN, Borderline T2DM, Morbid Obesity (BMI 45), Epilepsy, recent PE on Eliquis, LAUREN, tardive dyskinsia, Awho presents today as a hospital follow-up with newly diagnosed Stage IVb carcinoma.  S/p prolonged hospitalization for symptomatic anemia secondary to heavy vaginal bleeding while on AC for new diagnosis of BL PEs; S/p palliative RT 61/8-6/20.

## 2025-07-06 NOTE — ASSESSMENT
Addended by: CLINT FRAZIER on: 2/25/2022 10:03 AM     Modules accepted: Orders     [FreeTextEntry1] : Ms. Estrada is a 65 yo F w/ extensive PMHx including HFrEF, HTN, Borderline T2DM, Morbid Obesity (BMI 45), Epilepsy, recent PE on Eliquis, LAUREN, tardive dyskinesia, who presents today as a hospital follow-up with newly diagnosed Stage IVb carcinoma, origin unknown.  Extensive discussion with pt and daughter regarding widely metastatic disease, as well as underlying comorbidities creating complex care. Final pathology does not demonstrate source of origin, cervical vs. uterine. Pathology from EUA/Cervical biopsies on 6/13/25 still pending. Reviewed need for systemic chemotherapy, with Carboplatin AUC 5 / Paclitaxel 175mg/m2 / Pembrolizumab 200mg D1q21d +/- Bevacizumab 15mg/kg if cervical in origin. However, given PEs, hesitation to begin pt with Avastin without confirmed cervical origin (KEYNOTE A18 vs. KEYNOTE 826). Also reviewed given pt's extensive medical comorbidities, as well as Stage IVb disease, pt is not a surgical candidate.  Chemotherapy regimen prescribed: Carboplatin AUC 5 / Paclitaxel 175mg/m2 / Pembrolizumab 200mg D1q21d x 6C followed by Pembrolizumab maintenance 400mg D1 q6wks; Consider repeat imaging after 3 or 6C  Risks discussed: Carboplatin can be associated with low blood counts, including platelets with associated bleeding, white blood cells with risk of infection or sepsis, and anemia. The drug will cause nausea and vomiting, but medication will be given to decrease these effects. There is a risk of renal, hepatic and electrolyte abnormalities. There can be infusion reactions, which are more likely with more cycles administered. This drug is an irritant and may cause local reactions if extravasation occurs. Taxol will cause alopecia and can be associated with peripheral neuropathy which can be permanent or progressive. It can also cause decreased blood counts including white blood cells, with risk of infection or sepsis, anemia and thrombocytopenia. There is a risk of nausea and vomiting, mouth sores, and bone or muscle pain. Infusion can cause hypersensitivity reactions. This drug is an irritant and may cause local reactions if extravasation occurs. Pembrolizumab can cause any autoimmune reaction, most commonly thyroiditis, but can also cause pneumonitis, carditis, colitis, hepatitis, encephalitis, or uveitis among others

## 2025-07-06 NOTE — HISTORY OF PRESENT ILLNESS
[Disease: _____________________] : Disease: [unfilled] [de-identified] : Ms. Estrada is a 65 yo F w/ extensive PMHx including HFrEF, HTN, Borderline T2DM, Morbid Obesity (BMI 45), Epilepsy, recent PE on Eliquis, LAUREN, tardive dyskinsia, Awho presents today as a hospital follow-up with newly diagnosed Stage IVb carcinoma.  MRI Pelvis (5/12/25) FINDINGS: UTERUS: Large cervical mass measuring 5.1 x 4.9 cm. Parametrial invasion of tumor on the left and posteriorly. Additional enhancing, diffusion restricting nodularity separate from the dominant mass involving the lower one-third of the vagina, also consistent with tumor. No ureteral encasement or pelvic sidewall invasion. A few uterine fibroids, largest measuring up to 3 cm on the left. ENDOMETRIUM: 7 mm in thickness with suspicion for tumor involvement at the lower uterine segment endometrium, and possibly the posterior myometrium.  RIGHT OVARY: Normal. LEFT OVARY: Normal. ADNEXA: Within normal limits.  BLADDER: Within normal limits.  LYMPH NODES: Bilateral pelvic lymphadenopathy. Reference enlarged right external iliac chain lymph node measures 2.9 x 1.5 cm (901:20). Enlarged left internal iliac chain lymph node measures 2.0 x 1.3 cm (901:24).  VISUALIZED PORTIONS:  ABDOMINAL ORGANS: Within normal limits. BOWEL: Within normal limits. PERITONEUM: No ascites. VESSELS: Within normal limits. ABDOMINAL WALL: Within normal limits. BONES: Within normal limits.  IMPRESSION: *  Large cervical tumor with parametrial invasion. Suspicion for tumor involvement of the lower endometrium and posterior myometrium. *  Separate nodularity of the lower one-third of the vagina is also consistent with tumor. *  Pelvic lymphadenopathy.  PET CT (6/11/25) FINDINGS:  HEAD/NECK: Increased uptake at the tip of the tongue (SUV 10.0, image 29). Uptake at the vocal cords appear symmetric and is likely physiologic. Otherwise physiologic FDG activity in remaining visualized brain, head, and neck.  CHEST: Hypermetabolic nodes are seen at the subcarinal (1.4 x 1.3 cm, SUV 13.5, image 80), and RIGHT perihilar (SUV 2.3 x 1.6 cm, SUV 7.1, image 84). Mildly avid BILATERAL axillary nodes (i.e. LEFT axilla, 2.0 x 1.6 cm, SUV 3.0, image 86). LUNGS: Multiple FDG-avid pulmonary nodules seen BILATERALLY. For example, a 1.5 x 1.0 cm anterior RIGHT MIDDLE lobe pleural-based nodule with SUV 5.9 (image 86). PLEURA/PERICARDIUM: No abnormal FDG activity. No effusions. ESOPHAGUS/STOMACH: No abnormal FDG activity. HEPATOBILIARY/PANCREAS: Physiologic hepatobiliary FDG activity. For reference, liver SUV mean is 3.7. SPLEEN: Physiologic FDG activity. Normal size. ADRENAL GLANDS: No abnormal FDG activity. No nodule(s). KIDNEYS/URINARY BLADDER: Physiologic excreted FDG activity. REPRODUCTIVE ORGANS: Enlarged cervix with intense uptake (6.3 x 5.6 cm, SUV 20.2, image 192). No invasion of the cervix or extension beyond the corpus uteri. Adnexa may be scintigraphically involved (i.e. LEFT adnexa SUV 4.6, image 188). The vagina may be involved (SUV 11.2, image 211). IUD in place. ABDOMINOPELVIC NODES: Increased activity is seen at multiple iliac chain nodes. For example: Ill-defined lymphadenopathy at the LEFT common iliac region (2.2 x 1.9 cm, SUV 7.2, image 166) Right external iliac (2.5 x 2.2 cm, SUV 12.7, image 182) BOWEL/PERITONEUM/MESENTERY: No abnormal FDG activity. ABDOMINAL WALL: No abnormal FDG activity. BONES/SOFT TISSUES: Heterogeneous marrow uptake is seen which is likely physiologic and appears nonspecific. Muscular uptake adjacent to the shoulders is likely enthesopathic. VESSELS: Atherosclerotic calcification of nonaneurysmal abdominal aorta including visceral and/or runoff branches.  IMPRESSION: 1.  Enlarged cervix shows intense uptake consistent with malignant involvement. Adnexa and vagina may be scintigraphically as well. 2.  Multiple metastatic FDG-avid pulmonary nodules seen bilaterally. 3.  Hypermetabolic lymph nodes are seen above and below diaphragm consistent with malignant involvement. 4.  Nonspecific heterogeneous marrow uptake, nonspecific. Consider MRI if there is suspicion for marrow involvement, but this is not strongly favored.  Path (6/4/25) Final Diagnosis 1.  Endometrium, biopsy: - Fragments of poorly preserved atypical cells, suspicious for carcinoma, see comment. - Rare strips of benign squamous epithelium.  Verified by: Alla Maria MD (Electronic Signature) Reported on: 06/04/25 13:27 EDT, [facility], [address], [place] [address] _________________________________________________________________  Comment  Tissue shows severely fragmented and poorly preserved atypical cells with focal features of papillary architecture. Cytologic features include increased N/C ratio, scant cytoplasm, hyperchromatic nuclei and focal associated apoptosis.  Immunohistochemical stains are as follows: ER-positive NJ- focal rare positive p53- wild-type WT1- negative Napsin- negative MMR- intact  The overall findings are highly concerning for carcinoma.  Clinical correlation and close follow-up is essential [de-identified] : Carcinoma [de-identified] : Ms. Estrada is a 67 yo F w/ extensive PMHx including HFrEF, HTN, Borderline T2DM, Morbid Obesity (BMI 45), Epilepsy, recent PE on Eliquis, LAUREN, tardive dyskinsia, Awho presents today as a hospital follow-up with newly diagnosed Stage IVb carcinoma.  S/p prolonged hospitalization for symptomatic anemia secondary to heavy vaginal bleeding while on AC for new diagnosis of BL PEs; S/p palliative RT 61/8-6/20.

## 2025-07-06 NOTE — PLAN
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) - S/p palliative RT 6/18-6/20 for heavy vaginal bleeding/symptomatic anemia in setting of AC for Bilateral PEs - Final path from 6/13 pending - If cervical in origin, may consider Avastin addition (per Keynote 826), however hesitant given recent bilateral PE diagnosis - Plan: Carboplatin AUC 5 / Paclitaxel 175mg/m2 / Pembrolizumab 200mg D1q21d x6C followed by Pembrolizumab maintenance 400mg D1 q6wks; Consider repeat imaging after 3 or 6C - Prechemo labs - Chemo counseling w/ RN Nohemi [] PORT  PE - Bilateral PE (Dx 05/07/25)  - Eliquis 5mg BID - Follows w/ Pulm   HFpEF - ECHO (05/2025) TTE showed LVEF 65%, mild MR, mild RV enlargement, w/ reduced RV function, rsvp 56mmhg.  - Continue furosemide 20mg daily; HCTZ 12.5mg PO OD, Losartan 100mg PO OD - Patient states she is no longer on statin, coreg and no longer on plavix - Currently has implantable loop recorder 5/14/25 - Follow closely w/ Cardiology  Seizure Disorder - Continue phenytoin 100mg TID, gabapentin 300mg TID, levetiracetam 500mg BID  Gout - Colchicine daily  Depression - Sertraline 100mg  Asthma - Montelukast - prn nebs - Advair bid ordered.  GERD - ppi 40mg daily - Mylanta PRN  Cataract - tobramycin eye drops.

## 2025-07-06 NOTE — PLAN
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) - S/p palliative RT 6/18-6/20 for heavy vaginal bleeding/symptomatic anemia in setting of AC for Bilateral PEs - Final path from 6/13 pending - If cervical in origin, may consider Avastin addition (per Keynote 826), however hesitant given recent bilateral PE diagnosis - Plan: Carboplatin AUC 5 / Paclitaxel 175mg/m2 / Pembrolizumab 200mg D1q21d x6C followed by Pembrolizumab maintenance 400mg D1 q6wks; Consider repeat imaging after 3 or 6C - Prechemo labs - Chemo counseling w/ RN Nohemi [] PORT  PE - Bilateral PE (Dx 05/07/25)  - Eliquis 5mg BID - Follows w/ Pulm   HFpEF - ECHO (05/2025) TTE showed LVEF 65%, mild MR, mild RV enlargement, w/ reduced RV function, rsvp 56mmhg.  - Continue furosemide 20mg daily; HCTZ 12.5mg PO OD, Losartan 100mg PO OD - Patient states she is no longer on statin, coreg and no longer on plavix - Currently has implantable loop recorder 5/14/25 - Follow closely w/ Cardiology  Seizure Disorder - Continue phenytoin 100mg TID, gabapentin 300mg TID, levetiracetam 500mg BID  Gout - Colchicine daily  Depression - Sertraline 100mg  Asthma - Montelukast - prn nebs - Advair bid ordered.  GERD - ppi 40mg daily - Mylanta PRN  Cataract - tobramycin eye drops. Sski Pregnancy And Lactation Text: This medication is Pregnancy Category D and isn't considered safe during pregnancy. It is excreted in breast milk.

## 2025-07-06 NOTE — HISTORY OF PRESENT ILLNESS
[Disease: _____________________] : Disease: [unfilled] [de-identified] : Ms. Estrada is a 67 yo F w/ extensive PMHx including HFrEF, HTN, Borderline T2DM, Morbid Obesity (BMI 45), Epilepsy, recent PE on Eliquis, LAUREN, tardive dyskinsia, Awho presents today as a hospital follow-up with newly diagnosed Stage IVb carcinoma.  MRI Pelvis (5/12/25) FINDINGS: UTERUS: Large cervical mass measuring 5.1 x 4.9 cm. Parametrial invasion of tumor on the left and posteriorly. Additional enhancing, diffusion restricting nodularity separate from the dominant mass involving the lower one-third of the vagina, also consistent with tumor. No ureteral encasement or pelvic sidewall invasion. A few uterine fibroids, largest measuring up to 3 cm on the left. ENDOMETRIUM: 7 mm in thickness with suspicion for tumor involvement at the lower uterine segment endometrium, and possibly the posterior myometrium.  RIGHT OVARY: Normal. LEFT OVARY: Normal. ADNEXA: Within normal limits.  BLADDER: Within normal limits.  LYMPH NODES: Bilateral pelvic lymphadenopathy. Reference enlarged right external iliac chain lymph node measures 2.9 x 1.5 cm (901:20). Enlarged left internal iliac chain lymph node measures 2.0 x 1.3 cm (901:24).  VISUALIZED PORTIONS:  ABDOMINAL ORGANS: Within normal limits. BOWEL: Within normal limits. PERITONEUM: No ascites. VESSELS: Within normal limits. ABDOMINAL WALL: Within normal limits. BONES: Within normal limits.  IMPRESSION: *  Large cervical tumor with parametrial invasion. Suspicion for tumor involvement of the lower endometrium and posterior myometrium. *  Separate nodularity of the lower one-third of the vagina is also consistent with tumor. *  Pelvic lymphadenopathy.  PET CT (6/11/25) FINDINGS:  HEAD/NECK: Increased uptake at the tip of the tongue (SUV 10.0, image 29). Uptake at the vocal cords appear symmetric and is likely physiologic. Otherwise physiologic FDG activity in remaining visualized brain, head, and neck.  CHEST: Hypermetabolic nodes are seen at the subcarinal (1.4 x 1.3 cm, SUV 13.5, image 80), and RIGHT perihilar (SUV 2.3 x 1.6 cm, SUV 7.1, image 84). Mildly avid BILATERAL axillary nodes (i.e. LEFT axilla, 2.0 x 1.6 cm, SUV 3.0, image 86). LUNGS: Multiple FDG-avid pulmonary nodules seen BILATERALLY. For example, a 1.5 x 1.0 cm anterior RIGHT MIDDLE lobe pleural-based nodule with SUV 5.9 (image 86). PLEURA/PERICARDIUM: No abnormal FDG activity. No effusions. ESOPHAGUS/STOMACH: No abnormal FDG activity. HEPATOBILIARY/PANCREAS: Physiologic hepatobiliary FDG activity. For reference, liver SUV mean is 3.7. SPLEEN: Physiologic FDG activity. Normal size. ADRENAL GLANDS: No abnormal FDG activity. No nodule(s). KIDNEYS/URINARY BLADDER: Physiologic excreted FDG activity. REPRODUCTIVE ORGANS: Enlarged cervix with intense uptake (6.3 x 5.6 cm, SUV 20.2, image 192). No invasion of the cervix or extension beyond the corpus uteri. Adnexa may be scintigraphically involved (i.e. LEFT adnexa SUV 4.6, image 188). The vagina may be involved (SUV 11.2, image 211). IUD in place. ABDOMINOPELVIC NODES: Increased activity is seen at multiple iliac chain nodes. For example: Ill-defined lymphadenopathy at the LEFT common iliac region (2.2 x 1.9 cm, SUV 7.2, image 166) Right external iliac (2.5 x 2.2 cm, SUV 12.7, image 182) BOWEL/PERITONEUM/MESENTERY: No abnormal FDG activity. ABDOMINAL WALL: No abnormal FDG activity. BONES/SOFT TISSUES: Heterogeneous marrow uptake is seen which is likely physiologic and appears nonspecific. Muscular uptake adjacent to the shoulders is likely enthesopathic. VESSELS: Atherosclerotic calcification of nonaneurysmal abdominal aorta including visceral and/or runoff branches.  IMPRESSION: 1.  Enlarged cervix shows intense uptake consistent with malignant involvement. Adnexa and vagina may be scintigraphically as well. 2.  Multiple metastatic FDG-avid pulmonary nodules seen bilaterally. 3.  Hypermetabolic lymph nodes are seen above and below diaphragm consistent with malignant involvement. 4.  Nonspecific heterogeneous marrow uptake, nonspecific. Consider MRI if there is suspicion for marrow involvement, but this is not strongly favored.  Path (6/4/25) Final Diagnosis 1.  Endometrium, biopsy: - Fragments of poorly preserved atypical cells, suspicious for carcinoma, see comment. - Rare strips of benign squamous epithelium.  Verified by: Alla Maria MD (Electronic Signature) Reported on: 06/04/25 13:27 EDT, [facility], [address], [place] [address] _________________________________________________________________  Comment  Tissue shows severely fragmented and poorly preserved atypical cells with focal features of papillary architecture. Cytologic features include increased N/C ratio, scant cytoplasm, hyperchromatic nuclei and focal associated apoptosis.  Immunohistochemical stains are as follows: ER-positive GA- focal rare positive p53- wild-type WT1- negative Napsin- negative MMR- intact  The overall findings are highly concerning for carcinoma.  Clinical correlation and close follow-up is essential [de-identified] : Carcinoma [de-identified] : Ms. Estrada is a 65 yo F w/ extensive PMHx including HFrEF, HTN, Borderline T2DM, Morbid Obesity (BMI 45), Epilepsy, recent PE on Eliquis, LAUREN, tardive dyskinsia, Awho presents today as a hospital follow-up with newly diagnosed Stage IVb carcinoma.  S/p prolonged hospitalization for symptomatic anemia secondary to heavy vaginal bleeding while on AC for new diagnosis of BL PEs; S/p palliative RT 61/8-6/20.

## 2025-07-06 NOTE — ASSESSMENT
[FreeTextEntry1] : Ms. Estrada is a 67 yo F w/ extensive PMHx including HFrEF, HTN, Borderline T2DM, Morbid Obesity (BMI 45), Epilepsy, recent PE on Eliquis, LAUREN, tardive dyskinesia, who presents today as a hospital follow-up with newly diagnosed Stage IVb carcinoma, origin unknown.  Extensive discussion with pt and daughter regarding widely metastatic disease, as well as underlying comorbidities creating complex care. Final pathology does not demonstrate source of origin, cervical vs. uterine. Pathology from EUA/Cervical biopsies on 6/13/25 still pending. Reviewed need for systemic chemotherapy, with Carboplatin AUC 5 / Paclitaxel 175mg/m2 / Pembrolizumab 200mg D1q21d +/- Bevacizumab 15mg/kg if cervical in origin. However, given PEs, hesitation to begin pt with Avastin without confirmed cervical origin (KEYNOTE A18 vs. KEYNOTE 826). Also reviewed given pt's extensive medical comorbidities, as well as Stage IVb disease, pt is not a surgical candidate.  Chemotherapy regimen prescribed: Carboplatin AUC 5 / Paclitaxel 175mg/m2 / Pembrolizumab 200mg D1q21d x 6C followed by Pembrolizumab maintenance 400mg D1 q6wks; Consider repeat imaging after 3 or 6C  Risks discussed: Carboplatin can be associated with low blood counts, including platelets with associated bleeding, white blood cells with risk of infection or sepsis, and anemia. The drug will cause nausea and vomiting, but medication will be given to decrease these effects. There is a risk of renal, hepatic and electrolyte abnormalities. There can be infusion reactions, which are more likely with more cycles administered. This drug is an irritant and may cause local reactions if extravasation occurs. Taxol will cause alopecia and can be associated with peripheral neuropathy which can be permanent or progressive. It can also cause decreased blood counts including white blood cells, with risk of infection or sepsis, anemia and thrombocytopenia. There is a risk of nausea and vomiting, mouth sores, and bone or muscle pain. Infusion can cause hypersensitivity reactions. This drug is an irritant and may cause local reactions if extravasation occurs. Pembrolizumab can cause any autoimmune reaction, most commonly thyroiditis, but can also cause pneumonitis, carditis, colitis, hepatitis, encephalitis, or uveitis among others

## 2025-07-23 NOTE — VITALS
[Maximal Pain Intensity: 2/10] : 2/10 [Least Pain Intensity: 0/10] : 0/10 [Pain Description/Quality: ___] : Pain description/quality: [unfilled] [Pain Duration: ___] : Pain duration: [unfilled] [Pain Location: ___] : Pain Location: [unfilled] [NoTreatment Scheduled] : no treatment scheduled [70: Cares for self; unalbe to carry on normal activity or do active work.] : 70: Cares for self; unable to carry on normal activity or do active work. [Pain Interferes with ADLs] : Pain does not interfere with activities of daily living

## 2025-07-23 NOTE — DISEASE MANAGEMENT
[IVB] : IVB [Clinical] : TNM Stage: c [FreeTextEntry4] : mullerian carcinoma, favor endometrial origin [de-identified] : 1500 cGy [de-identified] : cervix

## 2025-07-23 NOTE — DISEASE MANAGEMENT
[IVB] : IVB [Clinical] : TNM Stage: c [FreeTextEntry4] : mullerian carcinoma, favor endometrial origin [de-identified] : 1500 cGy [de-identified] : cervix

## 2025-07-23 NOTE — HISTORY OF PRESENT ILLNESS
[FreeTextEntry1] : This 66-year-old female presents for post-treatment evaluation.  Ms. Estrada completed palliative radiation therapy for intractable bleeding while she was a patient at [facility]. Patient denies any continued bleeding from vagina. Reports regular daily bowel movements. Notes she has consult scheduled with Dr. Xiao to discuss/plan for chemotherapy.

## 2025-07-23 NOTE — HISTORY OF PRESENT ILLNESS
[FreeTextEntry1] : This 66-year-old female presents for post-treatment evaluation.  Ms. Estrada completed palliative radiation therapy for intractable bleeding while she was a patient at [facility]. Patient denies any continued bleeding from vagina. Reports regular daily bowel movements. Notes she has consult scheduled with Dr. Xiao to discuss/plan for chemotherapy.

## 2025-07-29 NOTE — REVIEW OF SYSTEMS
[Diarrhea: Grade 0] : Diarrhea: Grade 0 [Negative] : Allergic/Immunologic [FreeTextEntry8] : light daily vaginal spotting; pelvic cramping

## 2025-07-29 NOTE — PHYSICAL EXAM
[Capable of only limited self care, confined to bed or chair more than 50% of waking hours] : Status 3- Capable of only limited self care, confined to bed or chair more than 50% of waking hours [Obese] : obese [Normal] : affect appropriate [de-identified] : in poor physical health [de-identified] : obese, soft, non-tender, non-distended [de-identified] : Deferred

## 2025-07-29 NOTE — HISTORY OF PRESENT ILLNESS
[de-identified] : Ms. Estrada is a 65 yo F w/ extensive PMHx including HFrEF, HTN, Borderline T2DM, Morbid Obesity (BMI 45), Epilepsy, recent PE on Eliquis, LAUREN, tardive dyskinsia, Awho presents today as a hospital follow-up with newly diagnosed Stage IVb carcinoma.  MRI Pelvis (5/12/25) FINDINGS: UTERUS: Large cervical mass measuring 5.1 x 4.9 cm. Parametrial invasion of tumor on the left and posteriorly. Additional enhancing, diffusion restricting nodularity separate from the dominant mass involving the lower one-third of the vagina, also consistent with tumor. No ureteral encasement or pelvic sidewall invasion. A few uterine fibroids, largest measuring up to 3 cm on the left. ENDOMETRIUM: 7 mm in thickness with suspicion for tumor involvement at the lower uterine segment endometrium, and possibly the posterior myometrium.  RIGHT OVARY: Normal. LEFT OVARY: Normal. ADNEXA: Within normal limits.  BLADDER: Within normal limits.  LYMPH NODES: Bilateral pelvic lymphadenopathy. Reference enlarged right external iliac chain lymph node measures 2.9 x 1.5 cm (901:20). Enlarged left internal iliac chain lymph node measures 2.0 x 1.3 cm (901:24).  VISUALIZED PORTIONS:  ABDOMINAL ORGANS: Within normal limits. BOWEL: Within normal limits. PERITONEUM: No ascites. VESSELS: Within normal limits. ABDOMINAL WALL: Within normal limits. BONES: Within normal limits.  IMPRESSION: *  Large cervical tumor with parametrial invasion. Suspicion for tumor involvement of the lower endometrium and posterior myometrium. *  Separate nodularity of the lower one-third of the vagina is also consistent with tumor. *  Pelvic lymphadenopathy.  PET CT (6/11/25) FINDINGS:  HEAD/NECK: Increased uptake at the tip of the tongue (SUV 10.0, image 29). Uptake at the vocal cords appear symmetric and is likely physiologic. Otherwise physiologic FDG activity in remaining visualized brain, head, and neck.  CHEST: Hypermetabolic nodes are seen at the subcarinal (1.4 x 1.3 cm, SUV 13.5, image 80), and RIGHT perihilar (SUV 2.3 x 1.6 cm, SUV 7.1, image 84). Mildly avid BILATERAL axillary nodes (i.e. LEFT axilla, 2.0 x 1.6 cm, SUV 3.0, image 86). LUNGS: Multiple FDG-avid pulmonary nodules seen BILATERALLY. For example, a 1.5 x 1.0 cm anterior RIGHT MIDDLE lobe pleural-based nodule with SUV 5.9 (image 86). PLEURA/PERICARDIUM: No abnormal FDG activity. No effusions. ESOPHAGUS/STOMACH: No abnormal FDG activity. HEPATOBILIARY/PANCREAS: Physiologic hepatobiliary FDG activity. For reference, liver SUV mean is 3.7. SPLEEN: Physiologic FDG activity. Normal size. ADRENAL GLANDS: No abnormal FDG activity. No nodule(s). KIDNEYS/URINARY BLADDER: Physiologic excreted FDG activity. REPRODUCTIVE ORGANS: Enlarged cervix with intense uptake (6.3 x 5.6 cm, SUV 20.2, image 192). No invasion of the cervix or extension beyond the corpus uteri. Adnexa may be scintigraphically involved (i.e. LEFT adnexa SUV 4.6, image 188). The vagina may be involved (SUV 11.2, image 211). IUD in place. ABDOMINOPELVIC NODES: Increased activity is seen at multiple iliac chain nodes. For example: Ill-defined lymphadenopathy at the LEFT common iliac region (2.2 x 1.9 cm, SUV 7.2, image 166) Right external iliac (2.5 x 2.2 cm, SUV 12.7, image 182) BOWEL/PERITONEUM/MESENTERY: No abnormal FDG activity. ABDOMINAL WALL: No abnormal FDG activity. BONES/SOFT TISSUES: Heterogeneous marrow uptake is seen which is likely physiologic and appears nonspecific. Muscular uptake adjacent to the shoulders is likely enthesopathic. VESSELS: Atherosclerotic calcification of nonaneurysmal abdominal aorta including visceral and/or runoff branches.  IMPRESSION: 1.  Enlarged cervix shows intense uptake consistent with malignant involvement. Adnexa and vagina may be scintigraphically as well. 2.  Multiple metastatic FDG-avid pulmonary nodules seen bilaterally. 3.  Hypermetabolic lymph nodes are seen above and below diaphragm consistent with malignant involvement. 4.  Nonspecific heterogeneous marrow uptake, nonspecific. Consider MRI if there is suspicion for marrow involvement, but this is not strongly favored.  Path (6/4/25) Final Diagnosis 1.  Endometrium, biopsy: - Fragments of poorly preserved atypical cells, suspicious for carcinoma, see comment. - Rare strips of benign squamous epithelium.  Verified by: Alla Maria MD (Electronic Signature) Reported on: 06/04/25 13:27 EDT, [facility], [address], [place] [address] _________________________________________________________________  Comment  Tissue shows severely fragmented and poorly preserved atypical cells with focal features of papillary architecture. Cytologic features include increased N/C ratio, scant cytoplasm, hyperchromatic nuclei and focal associated apoptosis.  Immunohistochemical stains are as follows: ER-positive NE- focal rare positive p53- wild-type WT1- negative Napsin- negative MMR- intact  The overall findings are highly concerning for carcinoma.  Clinical correlation and close follow-up is essential [de-identified] : Carcinoma [de-identified] : Ms. Estrada is a 65 yo F w/ extensive PMHx including HFrEF, HTN, Borderline T2DM, Morbid Obesity (BMI 45), Epilepsy, recent PE on Eliquis, LAUREN, tardive dyskinsia, who presents today as a hospital follow-up with newly diagnosed Stage IVb carcinoma.  S/p prolonged hospitalization for symptomatic anemia secondary to heavy vaginal bleeding while on AC for new diagnosis of BL PEs; S/p palliative RT 61/8-6/20.   Today (7/29/25) Pt reports she has missed several appointments to schedule chemotherapy secondary to fear +Endorses continued vaginal bleeding, however reports overall improved, now like a light period +Cramping pelvic pain, like menstrual cramps - Tolerating PO w/o issue ECOG PS 3 at baseline  Final Diagnosis 1.  Cervical mass, biopsy: -Mullerian carcinoma, favor endometrial origin. -See comment.

## 2025-07-29 NOTE — PLAN
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  - Continue furosemide 20mg daily; HCTZ 12.5mg PO OD, Losartan 100mg PO OD - Patient states she is no longer on statin, coreg and no longer on plavix - Currently has implantable loop recorder 5/14/25 - Follow closely w/ Cardiology  Seizure Disorder - Continue phenytoin 100mg TID, gabapentin 300mg TID, levetiracetam 500mg BID [] Pt needs to schedule Neurology  Gout - Colchicine daily  Depression - Sertraline 100mg  Asthma - Montelukast - prn nebs - Advair bid ordered.  GERD - ppi 40mg daily - Mylanta PRN  Cataract - tobramycin eye drops.

## 2025-07-29 NOTE — ASSESSMENT
[FreeTextEntry1] : Ms. Estrada is a 67 yo F w/ extensive PMHx including HFrEF, HTN, Borderline T2DM, Morbid Obesity (BMI 45), Epilepsy, recent PE on Eliquis, LAUREN, tardive dyskinesia, who presents today as a hospital follow-up with newly diagnosed Stage IVb carcinoma, origin unknown.  Extensive discussion with pt regarding widely metastatic disease, as well as underlying comorbidities creating complex care. Final pathology c/w likely uterien origin.  Reviewed need for systemic chemotherapy, with Carboplatin AUC 5 / Paclitaxel 175mg/m2 / Pembrolizumab 200mg D1q21d. Also reviewed given pt's extensive medical comorbidities, as well as Stage IVb disease, pt is not a surgical candidate.  Chemotherapy regimen prescribed: Carboplatin AUC 5 / Paclitaxel 175mg/m2 / Pembrolizumab 200mg D1q21d x 6C followed by Pembrolizumab maintenance 400mg D1 q6wks; Consider repeat imaging after 3 or 6C  Risks discussed: Carboplatin can be associated with low blood counts, including platelets with associated bleeding, white blood cells with risk of infection or sepsis, and anemia. The drug will cause nausea and vomiting, but medication will be given to decrease these effects. There is a risk of renal, hepatic and electrolyte abnormalities. There can be infusion reactions, which are more likely with more cycles administered. This drug is an irritant and may cause local reactions if extravasation occurs. Taxol will cause alopecia and can be associated with peripheral neuropathy which can be permanent or progressive. It can also cause decreased blood counts including white blood cells, with risk of infection or sepsis, anemia and thrombocytopenia. There is a risk of nausea and vomiting, mouth sores, and bone or muscle pain. Infusion can cause hypersensitivity reactions. This drug is an irritant and may cause local reactions if extravasation occurs. Pembrolizumab can cause any autoimmune reaction, most commonly thyroiditis, but can also cause pneumonitis, carditis, colitis, hepatitis, encephalitis, or uveitis among others